# Patient Record
Sex: MALE | Race: BLACK OR AFRICAN AMERICAN | NOT HISPANIC OR LATINO | URBAN - METROPOLITAN AREA
[De-identification: names, ages, dates, MRNs, and addresses within clinical notes are randomized per-mention and may not be internally consistent; named-entity substitution may affect disease eponyms.]

---

## 2022-05-03 ENCOUNTER — LAB OUTSIDE AN ENCOUNTER (OUTPATIENT)
Dept: URBAN - METROPOLITAN AREA CLINIC 78 | Facility: CLINIC | Age: 22
End: 2022-05-03

## 2022-05-03 ENCOUNTER — TELEPHONE ENCOUNTER (OUTPATIENT)
Dept: URBAN - METROPOLITAN AREA CLINIC 78 | Facility: CLINIC | Age: 22
End: 2022-05-03

## 2022-05-03 ENCOUNTER — OFFICE VISIT (OUTPATIENT)
Dept: URBAN - METROPOLITAN AREA CLINIC 78 | Facility: CLINIC | Age: 22
End: 2022-05-03
Payer: COMMERCIAL

## 2022-05-03 VITALS
HEIGHT: 74 IN | WEIGHT: 179.2 LBS | DIASTOLIC BLOOD PRESSURE: 78 MMHG | HEART RATE: 64 BPM | TEMPERATURE: 97.8 F | SYSTOLIC BLOOD PRESSURE: 124 MMHG | RESPIRATION RATE: 16 BRPM | BODY MASS INDEX: 23 KG/M2

## 2022-05-03 DIAGNOSIS — R13.19 ESOPHAGEAL DYSPHAGIA: ICD-10-CM

## 2022-05-03 DIAGNOSIS — R12 HEARTBURN: ICD-10-CM

## 2022-05-03 DIAGNOSIS — R14.2 BURPING: ICD-10-CM

## 2022-05-03 DIAGNOSIS — Z87.11 HISTORY OF GASTRIC ULCER: ICD-10-CM

## 2022-05-03 DIAGNOSIS — K30 INDIGESTION: ICD-10-CM

## 2022-05-03 DIAGNOSIS — R09.89 THROAT TIGHTNESS: ICD-10-CM

## 2022-05-03 PROCEDURE — G8427 DOCREV CUR MEDS BY ELIG CLIN: HCPCS | Performed by: INTERNAL MEDICINE

## 2022-05-03 PROCEDURE — G9903 PT SCRN TBCO ID AS NON USER: HCPCS | Performed by: INTERNAL MEDICINE

## 2022-05-03 PROCEDURE — 1036F TOBACCO NON-USER: CPT | Performed by: INTERNAL MEDICINE

## 2022-05-03 PROCEDURE — G9622 NO UNHEAL ETOH USER: HCPCS | Performed by: INTERNAL MEDICINE

## 2022-05-03 PROCEDURE — G8420 CALC BMI NORM PARAMETERS: HCPCS | Performed by: INTERNAL MEDICINE

## 2022-05-03 PROCEDURE — 3017F COLORECTAL CA SCREEN DOC REV: CPT | Performed by: INTERNAL MEDICINE

## 2022-05-03 PROCEDURE — 99204 OFFICE O/P NEW MOD 45 MIN: CPT | Performed by: INTERNAL MEDICINE

## 2022-05-03 RX ORDER — PANTOPRAZOLE SODIUM 40 MG/1
1 TABLET TABLET, DELAYED RELEASE ORAL
Qty: 90 TABLET | Refills: 0 | OUTPATIENT
Start: 2022-05-03

## 2022-05-03 NOTE — HPI-TODAY'S VISIT:
21-year-old male presents for recurrent symptoms of throat tightness.  He saw ENT, cardiology, pulmonology, GI for this.  Unremarkable but multiple stomach ulcers on EGD last year.  GI doctor, Dr. Youssef.  Confirmed healing of ulcer with a repeat EGD. Recently he is having flareup of symptoms.  Symptom occurs every day.  Associated with indigestion, burping, heartburn, dysphagia intermittently with large bolus, throat discomfort with mucus and dryness. Denies weight loss, blood in stool/melena, frequent NSAID use, family history of esophageal cancer or gastric cancer.

## 2022-05-03 NOTE — PREVIOUS WORKUP REVIEWED
.ENDOSCOPIES-EGD 11/11/2021: Normal EGD.*Pathology: Gastric-normal.  Negative for H. pylori.-EGD 6/29/2021: Erosive esophagitis.  Erosive gastritis.*Pathology: Gastric-normal.  Negative for H. pylori. LABSIMAGES

## 2022-05-17 ENCOUNTER — TELEPHONE ENCOUNTER (OUTPATIENT)
Dept: URBAN - METROPOLITAN AREA CLINIC 78 | Facility: CLINIC | Age: 22
End: 2022-05-17

## 2022-05-27 ENCOUNTER — TELEPHONE ENCOUNTER (OUTPATIENT)
Dept: URBAN - METROPOLITAN AREA CLINIC 78 | Facility: CLINIC | Age: 22
End: 2022-05-27

## 2022-08-25 ENCOUNTER — TELEPHONE ENCOUNTER (OUTPATIENT)
Dept: URBAN - METROPOLITAN AREA CLINIC 23 | Facility: CLINIC | Age: 22
End: 2022-08-25

## 2022-09-15 ENCOUNTER — OFFICE VISIT (OUTPATIENT)
Dept: URBAN - METROPOLITAN AREA CLINIC 98 | Facility: CLINIC | Age: 22
End: 2022-09-15
Payer: COMMERCIAL

## 2022-09-15 ENCOUNTER — WEB ENCOUNTER (OUTPATIENT)
Dept: URBAN - METROPOLITAN AREA CLINIC 98 | Facility: CLINIC | Age: 22
End: 2022-09-15

## 2022-09-15 VITALS
BODY MASS INDEX: 22.33 KG/M2 | SYSTOLIC BLOOD PRESSURE: 83 MMHG | DIASTOLIC BLOOD PRESSURE: 62 MMHG | HEART RATE: 79 BPM | TEMPERATURE: 97.2 F | WEIGHT: 174 LBS | HEIGHT: 74 IN

## 2022-09-15 DIAGNOSIS — F41.9 ANXIETY: ICD-10-CM

## 2022-09-15 DIAGNOSIS — R13.12 OROPHARYNGEAL DYSPHAGIA: ICD-10-CM

## 2022-09-15 DIAGNOSIS — R12 HEARTBURN: ICD-10-CM

## 2022-09-15 PROCEDURE — 99214 OFFICE O/P EST MOD 30 MIN: CPT | Performed by: INTERNAL MEDICINE

## 2022-09-15 RX ORDER — PANTOPRAZOLE SODIUM 40 MG/1
1 TABLET TABLET, DELAYED RELEASE ORAL
Qty: 90 TABLET | Refills: 0 | Status: ON HOLD | COMMUNITY
Start: 2022-05-03

## 2022-09-15 RX ORDER — OMEPRAZOLE 40 MG/1
1 CAPSULE 30 MINUTES BEFORE MORNING MEAL CAPSULE, DELAYED RELEASE ORAL ONCE A DAY
Qty: 30 | Refills: 2

## 2022-09-15 NOTE — HPI-TODAY'S VISIT:
Mr. Philippe is a 21 yo M presenting for followup visit.  He is having a lot of congestion and gets dry throat. He feels he has to belch as soon as  He has shortness of breath that he attributes to anxiety. He feels like he is choking when he swallows.  He has a lot of throat clearing and spits up a lot of mucus with spicy food, bread, and dairy.  He is now on anxiety meds but they are not working well.  He has a lot of allergies (environmental) and is getting allergy treatment with an allergist (injections).  He is seeing ENT as well.  He is taking omeprazole 40 mg after eating.    I reviewed:  11/11/21 EGD: biopsies taken EGD path: normal 5/23/22 barium swallow: normal (patient unable to drink enough barium for full exam but tablet passed)

## 2022-09-16 ENCOUNTER — TELEPHONE ENCOUNTER (OUTPATIENT)
Dept: URBAN - METROPOLITAN AREA CLINIC 6 | Facility: CLINIC | Age: 22
End: 2022-09-16

## 2022-09-19 ENCOUNTER — OFFICE VISIT (OUTPATIENT)
Dept: URBAN - METROPOLITAN AREA SURGERY CENTER 18 | Facility: SURGERY CENTER | Age: 22
End: 2022-09-19

## 2022-10-10 ENCOUNTER — OFFICE VISIT (OUTPATIENT)
Dept: URBAN - METROPOLITAN AREA TELEHEALTH 2 | Facility: TELEHEALTH | Age: 22
End: 2022-10-10

## 2022-10-10 RX ORDER — OMEPRAZOLE 40 MG/1
1 CAPSULE 30 MINUTES BEFORE MORNING MEAL CAPSULE, DELAYED RELEASE ORAL ONCE A DAY
Qty: 30 | Refills: 2 | COMMUNITY

## 2022-10-10 RX ORDER — PANTOPRAZOLE SODIUM 40 MG/1
1 TABLET TABLET, DELAYED RELEASE ORAL
Qty: 90 TABLET | Refills: 0 | COMMUNITY
Start: 2022-05-03

## 2023-01-05 ENCOUNTER — OFFICE VISIT (OUTPATIENT)
Dept: URBAN - METROPOLITAN AREA CLINIC 35 | Facility: CLINIC | Age: 23
End: 2023-01-05

## 2023-01-10 ENCOUNTER — LAB OUTSIDE AN ENCOUNTER (OUTPATIENT)
Dept: URBAN - METROPOLITAN AREA CLINIC 35 | Facility: CLINIC | Age: 23
End: 2023-01-10

## 2023-01-10 ENCOUNTER — OFFICE VISIT (OUTPATIENT)
Dept: URBAN - METROPOLITAN AREA CLINIC 35 | Facility: CLINIC | Age: 23
End: 2023-01-10
Payer: COMMERCIAL

## 2023-01-10 VITALS
HEART RATE: 79 BPM | SYSTOLIC BLOOD PRESSURE: 112 MMHG | BODY MASS INDEX: 21.82 KG/M2 | OXYGEN SATURATION: 98 % | WEIGHT: 170 LBS | HEIGHT: 74 IN | DIASTOLIC BLOOD PRESSURE: 78 MMHG

## 2023-01-10 DIAGNOSIS — K30 INDIGESTION: ICD-10-CM

## 2023-01-10 DIAGNOSIS — R13.12 OROPHARYNGEAL DYSPHAGIA: ICD-10-CM

## 2023-01-10 DIAGNOSIS — F41.9 ANXIETY: ICD-10-CM

## 2023-01-10 DIAGNOSIS — R05.9 COUGH, UNSPECIFIED TYPE: ICD-10-CM

## 2023-01-10 PROBLEM — 162031009: Status: ACTIVE | Noted: 2022-05-03

## 2023-01-10 PROCEDURE — 99204 OFFICE O/P NEW MOD 45 MIN: CPT | Performed by: INTERNAL MEDICINE

## 2023-01-10 RX ORDER — PREDNISONE 20 MG/1
TABLET ORAL
Qty: 14 TABLET | Status: ACTIVE | COMMUNITY

## 2023-01-10 RX ORDER — FLUOXETINE HYDROCHLORIDE 10 MG/1
1 CAPSULE CAPSULE ORAL ONCE A DAY
Status: ACTIVE | COMMUNITY

## 2023-01-10 RX ORDER — OMEPRAZOLE 40 MG/1
1 CAPSULE 30 MINUTES BEFORE MORNING MEAL CAPSULE, DELAYED RELEASE ORAL ONCE A DAY
Qty: 30 | Refills: 2 | Status: ACTIVE | COMMUNITY

## 2023-01-10 RX ORDER — PANTOPRAZOLE SODIUM 40 MG/1
1 TABLET TABLET, DELAYED RELEASE ORAL
Qty: 90 TABLET | Refills: 0 | COMMUNITY
Start: 2022-05-03

## 2023-01-10 NOTE — HPI-ACID REFLUX
22 year old male patient presents for heartburn consult.  Patient was previously seen by Dr Youssef . Patient states he has been experiencing symptoms for many years . Patient admits  currently taking any  prescribed Omeprazole 40mg PRN  for relief of symptoms. Patient describes symptoms as severe dryness in mouth, burning sensation in throat ,dry cough, excess phlegm, belching and dysphagia . And states they are sporadic _. Patient denies nighttime symptoms. Patient denies nausea or vomiting. Patient admits any associated weight loss. Patient admits EGD in the past revealing esophageal ulcers .  of note patient has seen both ENT /Allergist for evaluation as well and was advised he does has post nasal drip but reflux is the main problems .

## 2023-02-06 ENCOUNTER — OFFICE VISIT (OUTPATIENT)
Dept: URBAN - METROPOLITAN AREA SURGERY CENTER 8 | Facility: SURGERY CENTER | Age: 23
End: 2023-02-06
Payer: COMMERCIAL

## 2023-02-06 ENCOUNTER — CLAIMS CREATED FROM THE CLAIM WINDOW (OUTPATIENT)
Dept: URBAN - METROPOLITAN AREA CLINIC 4 | Facility: CLINIC | Age: 23
End: 2023-02-06
Payer: COMMERCIAL

## 2023-02-06 DIAGNOSIS — K21.9 ACID REFLUX: ICD-10-CM

## 2023-02-06 DIAGNOSIS — R13.19 CERVICAL DYSPHAGIA: ICD-10-CM

## 2023-02-06 DIAGNOSIS — K22.89 DILATATION OF ESOPHAGUS: ICD-10-CM

## 2023-02-06 DIAGNOSIS — K29.70 GASTRITIS, UNSPECIFIED, WITHOUT BLEEDING: ICD-10-CM

## 2023-02-06 DIAGNOSIS — K29.60 ADENOPAPILLOMATOSIS GASTRICA: ICD-10-CM

## 2023-02-06 PROCEDURE — G8907 PT DOC NO EVENTS ON DISCHARG: HCPCS | Performed by: INTERNAL MEDICINE

## 2023-02-06 PROCEDURE — 88305 TISSUE EXAM BY PATHOLOGIST: CPT | Performed by: PATHOLOGY

## 2023-02-06 PROCEDURE — 88312 SPECIAL STAINS GROUP 1: CPT | Performed by: PATHOLOGY

## 2023-02-06 PROCEDURE — 43239 EGD BIOPSY SINGLE/MULTIPLE: CPT | Performed by: INTERNAL MEDICINE

## 2023-02-08 ENCOUNTER — OFFICE VISIT (OUTPATIENT)
Dept: URBAN - METROPOLITAN AREA CLINIC 36 | Facility: CLINIC | Age: 23
End: 2023-02-08
Payer: COMMERCIAL

## 2023-02-08 DIAGNOSIS — R05.9 COUGH: ICD-10-CM

## 2023-02-08 PROCEDURE — 91035 G-ESOPH REFLX TST W/ELECTROD: CPT | Performed by: INTERNAL MEDICINE

## 2023-03-06 ENCOUNTER — OFFICE VISIT (OUTPATIENT)
Dept: URBAN - METROPOLITAN AREA CLINIC 33 | Facility: CLINIC | Age: 23
End: 2023-03-06
Payer: COMMERCIAL

## 2023-03-06 VITALS
HEIGHT: 74 IN | SYSTOLIC BLOOD PRESSURE: 116 MMHG | WEIGHT: 182 LBS | DIASTOLIC BLOOD PRESSURE: 72 MMHG | HEART RATE: 77 BPM | BODY MASS INDEX: 23.36 KG/M2 | OXYGEN SATURATION: 98 %

## 2023-03-06 DIAGNOSIS — F41.9 ANXIETY: ICD-10-CM

## 2023-03-06 DIAGNOSIS — R13.12 OROPHARYNGEAL DYSPHAGIA: ICD-10-CM

## 2023-03-06 DIAGNOSIS — K21.00 GASTROESOPHAGEAL REFLUX DISEASE WITH ESOPHAGITIS WITHOUT HEMORRHAGE: ICD-10-CM

## 2023-03-06 DIAGNOSIS — R05.3 CHRONIC COUGH: ICD-10-CM

## 2023-03-06 PROBLEM — 48694002: Status: ACTIVE | Noted: 2022-09-15

## 2023-03-06 PROBLEM — 71457002: Status: ACTIVE | Noted: 2022-09-15

## 2023-03-06 PROBLEM — 266433003: Status: ACTIVE | Noted: 2023-03-06

## 2023-03-06 PROBLEM — 68154008: Status: ACTIVE | Noted: 2023-03-06

## 2023-03-06 PROCEDURE — 99213 OFFICE O/P EST LOW 20 MIN: CPT | Performed by: INTERNAL MEDICINE

## 2023-03-06 RX ORDER — OMEPRAZOLE 40 MG/1
1 CAPSULE 30 MINUTES BEFORE MORNING MEAL CAPSULE, DELAYED RELEASE ORAL ONCE A DAY
Qty: 30 | Refills: 2 | Status: ACTIVE | COMMUNITY

## 2023-03-06 RX ORDER — SALICYLIC ACID 3 G/100G
1 TABLET SWALLOW WHOLE WITH WATER; DO NOT TAKE WITH FRUIT JUICES LOTION TOPICAL ONCE A DAY
Status: ACTIVE | COMMUNITY

## 2023-03-06 RX ORDER — PREDNISONE 20 MG/1
TABLET ORAL
Qty: 14 TABLET | Status: ON HOLD | COMMUNITY

## 2023-03-06 RX ORDER — PANTOPRAZOLE SODIUM 40 MG/1
1 TABLET TABLET, DELAYED RELEASE ORAL
Qty: 90 TABLET | Refills: 0 | COMMUNITY
Start: 2022-05-03

## 2023-03-06 RX ORDER — FLUOXETINE HYDROCHLORIDE 10 MG/1
1 CAPSULE CAPSULE ORAL ONCE A DAY
Status: ACTIVE | COMMUNITY

## 2023-03-06 NOTE — HPI-ACID REFLUX
Patient presents for follow up . He admits continuing Omeprazole 40mg with good control of symptoms . He admits dysphagia has subsided . Denies nausea or vomiting . Pt denies complications post procedure 02/06 .    Of last visit (01/10/2022) 22 year old male patient presents for heartburn consult.  Patient was previously seen by Dr Youssef . Patient states he has been experiencing symptoms for many years . Patient admits  currently taking any  prescribed Omeprazole 40mg PRN  for relief of symptoms. Patient describes symptoms as severe dryness in mouth, burning sensation in throat ,dry cough, excess phlegm, belching and dysphagia . And states they are sporadic _. Patient denies nighttime symptoms. Patient denies nausea or vomiting. Patient admits any associated weight loss. Patient admits EGD in the past revealing esophageal ulcers .  of note patient has seen both ENT /Allergist for evaluation as well and was advised he does has post nasal drip but reflux is the main problems .

## 2023-03-07 ENCOUNTER — OFFICE VISIT (OUTPATIENT)
Dept: URBAN - METROPOLITAN AREA CLINIC 35 | Facility: CLINIC | Age: 23
End: 2023-03-07

## 2023-06-09 ENCOUNTER — TELEPHONE ENCOUNTER (OUTPATIENT)
Dept: URBAN - METROPOLITAN AREA CLINIC 35 | Facility: CLINIC | Age: 23
End: 2023-06-09

## 2023-06-09 ENCOUNTER — OFFICE VISIT (OUTPATIENT)
Dept: URBAN - METROPOLITAN AREA CLINIC 35 | Facility: CLINIC | Age: 23
End: 2023-06-09

## 2023-06-09 RX ORDER — PREDNISONE 20 MG/1
TABLET ORAL
Qty: 14 TABLET | Status: ON HOLD | COMMUNITY

## 2023-06-09 RX ORDER — FLUOXETINE HYDROCHLORIDE 10 MG/1
1 CAPSULE CAPSULE ORAL ONCE A DAY
Status: ACTIVE | COMMUNITY

## 2023-06-09 RX ORDER — SALICYLIC ACID 3 G/100G
1 TABLET SWALLOW WHOLE WITH WATER; DO NOT TAKE WITH FRUIT JUICES LOTION TOPICAL ONCE A DAY
Status: ACTIVE | COMMUNITY

## 2023-06-09 RX ORDER — PANTOPRAZOLE SODIUM 40 MG/1
1 TABLET TABLET, DELAYED RELEASE ORAL
Qty: 90 TABLET | Refills: 0 | COMMUNITY
Start: 2022-05-03

## 2023-06-09 RX ORDER — OMEPRAZOLE 40 MG/1
1 CAPSULE 30 MINUTES BEFORE MORNING MEAL CAPSULE, DELAYED RELEASE ORAL ONCE A DAY
Qty: 30 | Refills: 2 | Status: ACTIVE | COMMUNITY

## 2023-06-09 NOTE — HPI-ACID REFLUX
Patient presents for follow up of acid reflux . Patient admits /denies symptoms remaining controlled with Omeprazole 40mg . He admits /denies visiting General surgeon for fundoplication consult . Admits /denies nausea or vomiting .    Of last visit (03/06/2023) Patient presents for follow up . He admits continuing Omeprazole 40mg with good control of symptoms . He admits dysphagia has subsided . Denies nausea or vomiting . Pt denies complications post procedure 02/06 .    Of last visit (01/10/2022) 22 year old male patient presents for heartburn consult.  Patient was previously seen by Dr Youssef . Patient states he has been experiencing symptoms for many years . Patient admits  currently taking any  prescribed Omeprazole 40mg PRN  for relief of symptoms. Patient describes symptoms as severe dryness in mouth, burning sensation in throat ,dry cough, excess phlegm, belching and dysphagia . And states they are sporadic _. Patient denies nighttime symptoms. Patient denies nausea or vomiting. Patient admits any associated weight loss. Patient admits EGD in the past revealing esophageal ulcers .  of note patient has seen both ENT /Allergist for evaluation as well and was advised he does has post nasal drip but reflux is the main problems .

## 2023-08-17 ENCOUNTER — OFFICE VISIT (OUTPATIENT)
Dept: URBAN - METROPOLITAN AREA CLINIC 35 | Facility: CLINIC | Age: 23
End: 2023-08-17
Payer: COMMERCIAL

## 2023-08-17 ENCOUNTER — TELEPHONE ENCOUNTER (OUTPATIENT)
Dept: URBAN - METROPOLITAN AREA CLINIC 35 | Facility: CLINIC | Age: 23
End: 2023-08-17

## 2023-08-17 ENCOUNTER — DASHBOARD ENCOUNTERS (OUTPATIENT)
Age: 23
End: 2023-08-17

## 2023-08-17 ENCOUNTER — TELEPHONE ENCOUNTER (OUTPATIENT)
Dept: URBAN - METROPOLITAN AREA CLINIC 36 | Facility: CLINIC | Age: 23
End: 2023-08-17

## 2023-08-17 ENCOUNTER — LAB OUTSIDE AN ENCOUNTER (OUTPATIENT)
Dept: URBAN - METROPOLITAN AREA CLINIC 35 | Facility: CLINIC | Age: 23
End: 2023-08-17

## 2023-08-17 VITALS
HEIGHT: 74 IN | BODY MASS INDEX: 24.38 KG/M2 | OXYGEN SATURATION: 99 % | WEIGHT: 190 LBS | DIASTOLIC BLOOD PRESSURE: 72 MMHG | SYSTOLIC BLOOD PRESSURE: 124 MMHG | HEART RATE: 70 BPM

## 2023-08-17 DIAGNOSIS — R06.02 SHORTNESS OF BREATH: ICD-10-CM

## 2023-08-17 DIAGNOSIS — R07.89 ATYPICAL CHEST PAIN: ICD-10-CM

## 2023-08-17 DIAGNOSIS — K21.9 GERD WITHOUT ESOPHAGITIS: ICD-10-CM

## 2023-08-17 PROBLEM — 266435005: Status: ACTIVE | Noted: 2023-08-17

## 2023-08-17 PROCEDURE — 99214 OFFICE O/P EST MOD 30 MIN: CPT | Performed by: INTERNAL MEDICINE

## 2023-08-17 RX ORDER — SALICYLIC ACID 3 G/100G
1 TABLET SWALLOW WHOLE WITH WATER; DO NOT TAKE WITH FRUIT JUICES LOTION TOPICAL ONCE A DAY
Status: ACTIVE | COMMUNITY

## 2023-08-17 RX ORDER — PANTOPRAZOLE SODIUM 40 MG/1
1 TABLET TABLET, DELAYED RELEASE ORAL
Qty: 60 | Refills: 0 | OUTPATIENT
Start: 2023-08-17

## 2023-08-17 RX ORDER — PREDNISONE 20 MG/1
TABLET ORAL
Qty: 14 TABLET | Status: ON HOLD | COMMUNITY

## 2023-08-17 RX ORDER — FLUOXETINE HYDROCHLORIDE 10 MG/1
1 CAPSULE CAPSULE ORAL ONCE A DAY
Status: ACTIVE | COMMUNITY

## 2023-08-17 RX ORDER — PANTOPRAZOLE SODIUM 40 MG/1
1 TABLET TABLET, DELAYED RELEASE ORAL ONCE A DAY
Status: ACTIVE | COMMUNITY

## 2023-08-17 NOTE — HPI-HOSPITAL FOLLOW-UP
23 year old patient presents for a hospital follow-up. Sunday night he went to ER. Few days before ER visit, patient started to have SOB. Then, Saturday he started to have chest pain, described as tightness. No associated N/V. Saturday he went to Urgent Care ([facility]). CXR done and it was NL.  Sunday he went to [facility] ER GERD symptoms are not controlled: heartburn, spitting up mucus, belching, and a lot of different foods are triggers: spicy, large meals, dairy, certain meats, blueberries. No constipation. No dysphagia On Pantoprazole since ER visit  EGD with [facility] 2/2023: DeMeester score of 75.5 and total acid exposure time of 23.3% NL esophagus described on EGD, no HH. Bxs of distal esophagus showing reflux changes.

## 2023-08-25 ENCOUNTER — TELEPHONE ENCOUNTER (OUTPATIENT)
Dept: URBAN - METROPOLITAN AREA CLINIC 36 | Facility: CLINIC | Age: 23
End: 2023-08-25

## 2023-08-25 ENCOUNTER — TELEPHONE ENCOUNTER (OUTPATIENT)
Dept: URBAN - METROPOLITAN AREA CLINIC 35 | Facility: CLINIC | Age: 23
End: 2023-08-25

## 2023-08-25 RX ORDER — ESOMEPRAZOLE MAGNESIUM 40 MG/1
1 CAPSULE CAPSULE, DELAYED RELEASE ORAL ONCE A DAY
Qty: 30 | Refills: 3 | OUTPATIENT
Start: 2023-08-25

## 2023-09-05 ENCOUNTER — TELEPHONE ENCOUNTER (OUTPATIENT)
Dept: URBAN - METROPOLITAN AREA CLINIC 35 | Facility: CLINIC | Age: 23
End: 2023-09-05

## 2023-09-05 ENCOUNTER — LAB OUTSIDE AN ENCOUNTER (OUTPATIENT)
Dept: URBAN - METROPOLITAN AREA CLINIC 35 | Facility: CLINIC | Age: 23
End: 2023-09-05

## 2023-09-15 ENCOUNTER — OFFICE VISIT (OUTPATIENT)
Dept: URBAN - METROPOLITAN AREA CLINIC 35 | Facility: CLINIC | Age: 23
End: 2023-09-15

## 2023-09-15 NOTE — HPI-HOSPITAL FOLLOW-UP
Patient presents today for a follow up of GERD. Admits/Denies starting Pantoprazole.   US ABD report shows: No acute abnormality is evident.   Last visit: 23 year old patient presents for a hospital follow-up. Sunday night he went to ER. Few days before ER visit, patient started to have SOB. Then, Saturday he started to have chest pain, described as tightness. No associated N/V. Saturday he went to Urgent Care ([facility]). CXR done and it was NL.  Sunday he went to [facility] ER GERD symptoms are not controlled: heartburn, spitting up mucus, belching, and a lot of different foods are triggers: spicy, large meals, dairy, certain meats, blueberries. No constipation. No dysphagia On Pantoprazole since ER visit  EGD with [facility] 2/2023: DeMeester score of 75.5 and total acid exposure time of 23.3% NL esophagus described on EGD, no HH. Bxs of distal esophagus showing reflux changes. 23 year old patient presents for a hospital follow-up. Sunday night he went to ER. Few days before ER visit, patient started to have SOB. Then, Saturday he started to have chest pain, described as tightness. No associated N/V. Saturday he went to Urgent Care ([facility]). CXR done and it was NL.  Sunday he went to [facility] ER GERD symptoms are not controlled: heartburn, spitting up mucus, belching, and a lot of different foods are triggers: spicy, large meals, dairy, certain meats, blueberries. No constipation. No dysphagia On Pantoprazole since ER visit  EGD with [facility] 2/2023: DeMeester score of 75.5 and total acid exposure time of 23.3% NL esophagus described on EGD, no HH. Bxs of distal esophagus showing reflux changes.

## 2023-09-25 ENCOUNTER — TELEPHONE ENCOUNTER (OUTPATIENT)
Dept: URBAN - METROPOLITAN AREA CLINIC 35 | Facility: CLINIC | Age: 23
End: 2023-09-25

## 2023-10-06 ENCOUNTER — OFFICE VISIT (OUTPATIENT)
Dept: URBAN - METROPOLITAN AREA CLINIC 35 | Facility: CLINIC | Age: 23
End: 2023-10-06

## 2024-08-05 ENCOUNTER — OFFICE VISIT (OUTPATIENT)
Dept: URBAN - METROPOLITAN AREA CLINIC 35 | Facility: CLINIC | Age: 24
End: 2024-08-05
Payer: COMMERCIAL

## 2024-08-05 ENCOUNTER — TELEPHONE ENCOUNTER (OUTPATIENT)
Dept: URBAN - METROPOLITAN AREA CLINIC 35 | Facility: CLINIC | Age: 24
End: 2024-08-05

## 2024-08-05 VITALS
BODY MASS INDEX: 22.59 KG/M2 | SYSTOLIC BLOOD PRESSURE: 110 MMHG | WEIGHT: 176 LBS | HEIGHT: 74 IN | DIASTOLIC BLOOD PRESSURE: 72 MMHG

## 2024-08-05 DIAGNOSIS — R10.84 ABDOMINAL CRAMPING, GENERALIZED: ICD-10-CM

## 2024-08-05 DIAGNOSIS — K21.9 GERD WITHOUT ESOPHAGITIS: ICD-10-CM

## 2024-08-05 PROCEDURE — 99214 OFFICE O/P EST MOD 30 MIN: CPT | Performed by: PHYSICIAN ASSISTANT

## 2024-08-05 RX ORDER — PREDNISONE 20 MG/1
TABLET ORAL
Qty: 14 TABLET | Status: ON HOLD | COMMUNITY

## 2024-08-05 RX ORDER — OMEPRAZOLE 40 MG/1
1 CAPSULE 30 MINUTES BEFORE MORNING MEAL CAPSULE, DELAYED RELEASE ORAL ONCE A DAY
Status: ACTIVE | COMMUNITY

## 2024-08-05 RX ORDER — SALICYLIC ACID 3 G/100G
1 TABLET SWALLOW WHOLE WITH WATER; DO NOT TAKE WITH FRUIT JUICES LOTION TOPICAL ONCE A DAY
Status: ACTIVE | COMMUNITY

## 2024-08-05 RX ORDER — FLUOXETINE HYDROCHLORIDE 10 MG/1
1 CAPSULE CAPSULE ORAL ONCE A DAY
Status: ON HOLD | COMMUNITY

## 2024-08-05 RX ORDER — PANTOPRAZOLE SODIUM 40 MG/1
1 TABLET TABLET, DELAYED RELEASE ORAL ONCE A DAY
Status: ON HOLD | COMMUNITY

## 2024-08-05 RX ORDER — PANTOPRAZOLE SODIUM 40 MG/1
1 TABLET TABLET, DELAYED RELEASE ORAL
Qty: 60 | Refills: 0 | Status: ON HOLD | COMMUNITY
Start: 2023-08-17

## 2024-08-05 RX ORDER — ESOMEPRAZOLE MAGNESIUM 40 MG/1
1 CAPSULE CAPSULE, DELAYED RELEASE ORAL ONCE A DAY
Qty: 30 | Refills: 3 | Status: ON HOLD | COMMUNITY
Start: 2023-08-25

## 2024-08-05 NOTE — HPI-HEARTBURN
Patient presents today for a follow up of GERD. Denies starting Pantoprazole. Admits taking Omeprazole 40 mg. He admits he has had some episodes since his last visit. He admits eating spicy foods and beef worsen his symptoms. He admits he has not been able to exercise because it worsens his symptoms. Because of this he has lost weight.   He admits a recent EGD with [facility] in June with mary ann gonzalez. He has lost weight.  He will have reflux symptoms even if he lifts weighits or running with his dog.  He has to avoid spicy.  He is on Omeprazole 40mg daily and even twice a day but gives him headaches.   He went to see a surgeon about reflux surgery, and he was told he can't get it done now because he's symptomatic.  US ABD report shows: No acute abnormality is evident.  Last visit: 23 year old patient presents for a hospital follow-up. Sunday night he went to ER. Few days before ER visit, patient started to have SOB. Then, Saturday he started to have chest pain, described as tightness. No associated N/V. Saturday he went to Urgent Care ([facility]). CXR done and it was NL. Sunday he went to [facility] ER GERD symptoms are not controlled: heartburn, spitting up mucus, belching, and a lot of different foods are triggers: spicy, large meals, dairy, certain meats, blueberries. No constipation. No dysphagia On Pantoprazole since ER visit  EGD with [facility] 2/2023: DeMeester score of 75.5 and total acid exposure time of 23.3% NL esophagus described on EGD, no HH. Bxs of distal esophagus showing reflux changes. 23 year old patient presents for a hospital follow-up. Sunday night he went to ER. Few days before ER visit, patient started to have SOB. Then, Saturday he started to have chest pain, described as tightness. No associated N/V. Saturday he went to Urgent Care ([facility]). CXR done and it was NL. Sunday he went to [facility] ER GERD symptoms are not controlled: heartburn, spitting up mucus, belching, and a lot of different foods are triggers: spicy, large meals, dairy, certain meats, blueberries. No constipation. No dysphagia On Pantoprazole since ER visit  EGD with [facility] 2/2023: DeMeester score of 75.5 and total acid exposure time of 23.3% NL esophagus described on EGD, no HH. Bxs of distal esophagus showing reflux changes.